# Patient Record
Sex: MALE | Race: WHITE | NOT HISPANIC OR LATINO | URBAN - NONMETROPOLITAN AREA
[De-identification: names, ages, dates, MRNs, and addresses within clinical notes are randomized per-mention and may not be internally consistent; named-entity substitution may affect disease eponyms.]

---

## 2021-07-20 ENCOUNTER — OFFICE (OUTPATIENT)
Dept: URBAN - NONMETROPOLITAN AREA CLINIC 9 | Facility: CLINIC | Age: 71
End: 2021-07-20

## 2021-07-20 VITALS
HEIGHT: 69 IN | HEART RATE: 76 BPM | WEIGHT: 296 LBS | DIASTOLIC BLOOD PRESSURE: 72 MMHG | SYSTOLIC BLOOD PRESSURE: 114 MMHG | RESPIRATION RATE: 17 BRPM

## 2021-07-20 DIAGNOSIS — K21.9 GASTRO-ESOPHAGEAL REFLUX DISEASE WITHOUT ESOPHAGITIS: ICD-10-CM

## 2021-07-20 DIAGNOSIS — I49.5 SICK SINUS SYNDROME: ICD-10-CM

## 2021-07-20 DIAGNOSIS — K57.30 DIVERTICULOSIS OF LARGE INTESTINE WITHOUT PERFORATION OR ABS: ICD-10-CM

## 2021-07-20 DIAGNOSIS — J45.909 UNSPECIFIED ASTHMA, UNCOMPLICATED: ICD-10-CM

## 2021-07-20 PROCEDURE — 99214 OFFICE O/P EST MOD 30 MIN: CPT | Mod: 95

## 2021-07-20 NOTE — SERVICENOTES
This visit was completed via ZOOM due to the restrictions of the COVID-19 pandemic. All issues as below were discussed and addressed.  Patient verbally consented to visit. exam was performed with the assistance Pushpa Holland LPN , who was present in the exam room with patient
Start time:
End time:, This visit was completed via ZOOM due to the restrictions of the COVID-19 pandemic. All issues as below were discussed and addressed.  Patient verbally consented to visit. exam was performed with the assistance Pushpa Holland LPN , who was present in the exam room with patient
Start time:09:10
End time:09:25

## 2021-07-20 NOTE — SERVICEHPINOTES
Shorty Rosales   is a         70   year old        male      who is here today for routine follow up.  70-year-old  male with history of GERD, asthma, sick sinus syndrome status post pacemaker placement who was hospitalized April due to right lower lobe pneumonia.  At that time, he was having severe heartburn and regurgitation.  He reports that since starting his new PPI regimen, he has been fairly asymptomatic.  Review of blood work from his hospitalization showed a normal CBC and CMP.  He does have a history of gastric sleeve.  He is not having any dysphagia or odynophagia.  No nausea vomiting, melena or bright red blood per rectum.  He is up-to-date on colon cancer screening.

## 2022-02-14 ENCOUNTER — OFFICE (OUTPATIENT)
Dept: URBAN - NONMETROPOLITAN AREA CLINIC 9 | Facility: CLINIC | Age: 72
End: 2022-02-14

## 2022-02-14 VITALS
RESPIRATION RATE: 16 BRPM | HEIGHT: 69 IN | WEIGHT: 293 LBS | SYSTOLIC BLOOD PRESSURE: 118 MMHG | DIASTOLIC BLOOD PRESSURE: 77 MMHG | HEART RATE: 64 BPM

## 2022-02-14 DIAGNOSIS — G47.33 OBSTRUCTIVE SLEEP APNEA (ADULT) (PEDIATRIC): ICD-10-CM

## 2022-02-14 DIAGNOSIS — I49.5 SICK SINUS SYNDROME: ICD-10-CM

## 2022-02-14 DIAGNOSIS — J45.909 UNSPECIFIED ASTHMA, UNCOMPLICATED: ICD-10-CM

## 2022-02-14 DIAGNOSIS — K21.9 GASTRO-ESOPHAGEAL REFLUX DISEASE WITHOUT ESOPHAGITIS: ICD-10-CM

## 2022-02-14 DIAGNOSIS — K57.30 DIVERTICULOSIS OF LARGE INTESTINE WITHOUT PERFORATION OR ABS: ICD-10-CM

## 2022-02-14 PROCEDURE — 99214 OFFICE O/P EST MOD 30 MIN: CPT | Mod: 95

## 2022-02-14 NOTE — SERVICENOTES
This visit was completed via ZOOM due to the restrictions of the COVID-19 pandemic. All issues as below were discussed and addressed.  Patient verbally consented to visit. exam was performed with the assistance Pushpa Holland LPN , who was present in the exam room with patient
Start time:11:00
End time:11:15

## 2022-02-14 NOTE — SERVICEHPINOTES
Shorty Rosales   is a   71   year old  male   who is here today for   Followup of GERD.  He reports doing quite well on Protonix.  He was hospitalized in mid 2021 with pneumonia.  At that time, his omeprazole was changed to Protonix.  He denies any dysphagia, odynophagia.  He denies any nausea or vomiting.  He notes no change in bowel habits.  No bright red blood per rectum or melena. CBC and CMP from October 2021 showed a normal white blood cell count, hemoglobin, platelet count, BUN and creatinine and normal liver function tests. He has a history of obstructive sleep apnea on nightly CPAP. His notes from pulmonology were reviewed. He was seen in the ER in late December 2021 for cough and congestion. His influenza a and COVID were negative. He was treated with Decadron. He does have a history of gastric banding. He had band removal with gastric sleeve in 2016. He also has a history of atrial fibrillation and sick sinus syndrome status post pacemaker placement. .